# Patient Record
(demographics unavailable — no encounter records)

---

## 2017-12-13 PROCEDURE — 86900 BLOOD TYPING SEROLOGIC ABO: CPT | Performed by: OBSTETRICS & GYNECOLOGY

## 2017-12-13 PROCEDURE — 86780 TREPONEMA PALLIDUM: CPT | Performed by: OBSTETRICS & GYNECOLOGY

## 2017-12-13 PROCEDURE — 81003 URINALYSIS AUTO W/O SCOPE: CPT | Performed by: OBSTETRICS & GYNECOLOGY

## 2017-12-13 PROCEDURE — 36415 COLL VENOUS BLD VENIPUNCTURE: CPT | Performed by: OBSTETRICS & GYNECOLOGY

## 2017-12-13 PROCEDURE — 86901 BLOOD TYPING SEROLOGIC RH(D): CPT | Performed by: OBSTETRICS & GYNECOLOGY

## 2017-12-13 PROCEDURE — 86762 RUBELLA ANTIBODY: CPT | Performed by: OBSTETRICS & GYNECOLOGY

## 2017-12-13 PROCEDURE — 82105 ALPHA-FETOPROTEIN SERUM: CPT | Performed by: OBSTETRICS & GYNECOLOGY

## 2017-12-13 PROCEDURE — 86850 RBC ANTIBODY SCREEN: CPT | Performed by: OBSTETRICS & GYNECOLOGY

## 2017-12-13 PROCEDURE — 85025 COMPLETE CBC W/AUTO DIFF WBC: CPT | Performed by: OBSTETRICS & GYNECOLOGY

## 2017-12-13 PROCEDURE — 87389 HIV-1 AG W/HIV-1&-2 AB AG IA: CPT | Performed by: OBSTETRICS & GYNECOLOGY

## 2017-12-13 PROCEDURE — 87340 HEPATITIS B SURFACE AG IA: CPT | Performed by: OBSTETRICS & GYNECOLOGY

## 2018-03-06 PROCEDURE — 82950 GLUCOSE TEST: CPT | Performed by: OBSTETRICS & GYNECOLOGY

## 2018-03-06 PROCEDURE — 87389 HIV-1 AG W/HIV-1&-2 AB AG IA: CPT | Performed by: OBSTETRICS & GYNECOLOGY

## 2018-03-06 PROCEDURE — 86780 TREPONEMA PALLIDUM: CPT | Performed by: OBSTETRICS & GYNECOLOGY

## 2018-03-13 PROCEDURE — 82952 GTT-ADDED SAMPLES: CPT | Performed by: OBSTETRICS & GYNECOLOGY

## 2018-03-13 PROCEDURE — 82951 GLUCOSE TOLERANCE TEST (GTT): CPT | Performed by: OBSTETRICS & GYNECOLOGY

## 2018-03-13 PROCEDURE — 36415 COLL VENOUS BLD VENIPUNCTURE: CPT | Performed by: OBSTETRICS & GYNECOLOGY

## 2018-04-04 PROBLEM — Z34.03 ENCOUNTER FOR SUPERVISION OF NORMAL FIRST PREGNANCY IN THIRD TRIMESTER: Status: ACTIVE | Noted: 2018-04-04

## 2018-05-02 PROCEDURE — 87653 STREP B DNA AMP PROBE: CPT | Performed by: OBSTETRICS & GYNECOLOGY

## 2018-05-02 PROCEDURE — 87081 CULTURE SCREEN ONLY: CPT | Performed by: OBSTETRICS & GYNECOLOGY

## 2024-03-12 NOTE — ED INITIAL ASSESSMENT (HPI)
Pt reports nasal congestion, headache, nausea, and shortness of breath which started a few days ago.

## 2024-03-12 NOTE — ED PROVIDER NOTES
Patient Seen in: [facility]      History     Chief Complaint   Patient presents with    Cough     Stated Complaint: short of breath/nausea    Subjective:   HPI    This is a 30-year-old female with history of anemia and asthma presenting with nasal congestion headache nausea cough and shortness of breath.  Patient states a few days ago her symptoms started.  Patient states she thinks that some kind of a cold but wanted to be evaluated.  No known COVID or influenza exposure.  No chest pain vomiting or diarrhea.    Objective:   Past Medical History:   Diagnosis Date    Anemia     Gallstone               History reviewed. No pertinent surgical history.             Social History     Socioeconomic History    Marital status:    Tobacco Use    Smoking status: Never    Smokeless tobacco: Never   Substance and Sexual Activity    Alcohol use: No    Drug use: No              Review of Systems    Positive for stated complaint: short of breath/nausea  Other systems are as noted in HPI.  Constitutional and vital signs reviewed.      All other systems reviewed and negative except as noted above.    Physical Exam     ED Triage Vitals [03/12/24 1241]   /75   Pulse 100   Resp 18   Temp 97.8 °F (36.6 °C)   Temp src Temporal   SpO2 98 %   O2 Device None (Room air)       Current:/75   Pulse 100   Temp 97.8 °F (36.6 °C) (Temporal)   Resp 18   LMP 02/21/2024   SpO2 98%         Physical Exam  Vitals and nursing note reviewed.   Constitutional:       Appearance: Normal appearance.   HENT:      Right Ear: Tympanic membrane normal.      Left Ear: Tympanic membrane normal.      Nose: Congestion and rhinorrhea present.      Mouth/Throat:      Mouth: Mucous membranes are moist.      Pharynx: Oropharynx is clear. No posterior oropharyngeal erythema.      Comments: No hot potato voice or trismus uvula midline  Eyes:      Conjunctiva/sclera: Conjunctivae normal.   Cardiovascular:      Rate and Rhythm: Normal  rate.   Pulmonary:      Effort: Pulmonary effort is normal. No respiratory distress.      Comments: Lungs clear with faint expiratory wheeze no hypoxia or respiratory distress  + cough  Musculoskeletal:         General: Normal range of motion.      Cervical back: Normal range of motion. No rigidity or tenderness.   Lymphadenopathy:      Cervical: No cervical adenopathy.   Neurological:      Mental Status: She is alert and oriented to person, place, and time.               ED Course     Labs Reviewed   RAPID SARS-COV-2 BY PCR - Abnormal; Notable for the following components:       Result Value    Rapid SARS-CoV-2 by PCR Detected (*)     All other components within normal limits            MDM          Medical Decision Making  30-year-old female well-appearing nontoxic afebrile no hypoxia or distress speaking in full sentences with viral symptoms.  No clinical indication for labs or imaging concern for COVID influenza versus URI versus another respiratory or viral illness or acute bronchospasm.  Patient will be swabbed for COVID if COVID is negative then influenza swab will be collected.    COVID-positive patient made aware results discussed symptoms are likely related to COVID.  Discussed prescribing albuterol inhaler and nebulizer solution to use at home for any wheezing and the cough.  Discussed over-the-counter allergy meds Flonase and Zyrtec to help with runny nose congestion or postnasal drip.  Discussed ibuprofen and Tylenol for pain.  Discussed social isolation per CDC protocol and strict ER precautions with any new or worsening symptoms.  Patient feels comfortable with the plan of care and acknowledges understanding discharge instructions.    Problems Addressed:  COVID-19: acute illness or injury    Amount and/or Complexity of Data Reviewed  Labs:  Decision-making details documented in ED Course.    Risk  OTC drugs.  Prescription drug management.        Disposition and Plan     Clinical Impression:  1.  COVID-19         Disposition:  Discharge  3/12/2024  1:00 pm    Follow-up:    Follow-up with your primary care provider if symptoms persist or worsen              Medications Prescribed:  Current Discharge Medication List        START taking these medications    Details   albuterol 108 (90 Base) MCG/ACT Inhalation Aero Soln Inhale 2 puffs into the lungs every 4 (four) hours as needed for Wheezing or Shortness of Breath.  Qty: 1 each, Refills: 0      albuterol (2.5 MG/3ML) 0.083% Inhalation Nebu Soln Take 3 mL (2.5 mg total) by nebulization every 4 (four) hours as needed for Wheezing or Shortness of Breath.  Qty: 30 each, Refills: 0

## 2024-03-12 NOTE — DISCHARGE INSTRUCTIONS
Social isolation as this is the recommendation of the CDC.  If you develop worsening symptoms such as chest pain shortness of breath nausea vomiting or diarrhea severe headache or high fever go to the emergency department.  For mild symptoms such as body aches chills slight headache low-grade temp take Tylenol/ibuprofen.  Hydrate well but do not over hydrate and eat food as tolerated.  You may try over-the-counter Flonase and Zyrtec to help with nasal congestion and runny nose and any postnasal drip

## 2024-06-02 NOTE — ED INITIAL ASSESSMENT (HPI)
Pt reports sore throat beginning Wednesday with subjective temperatures.   C/o eye irritation and ear pain.

## 2024-06-02 NOTE — ED PROVIDER NOTES
Patient Seen in: [facility]      History     Chief Complaint   Patient presents with    Sore Throat     Stated Complaint: ear problem; eye problem; sore throat  Subjective:   29 y/o healthy female presents for a sore throat, nasal congestion, right ear pain, and bilateral eye redness and drainage that started about 4 days ago.  She denies any change in vision.  She wears glasses, but not contact lenses.  No eye pain.  No fevers.  She is eating and drinking without vomiting or diarrhea.  No neck stiffness.  No rashes.  No headaches.  No dizziness.  No drainage from the right ear.  She does have some muffled hearing.  No mastoid complaints.  No sick contacts at home.  She states she had COVID within the past 90 days.  She appears nontoxic.      Objective:   Past Medical History:    Anemia    Gallstone            History reviewed. No pertinent surgical history.           Social History     Socioeconomic History    Marital status:    Tobacco Use    Smoking status: Never    Smokeless tobacco: Never   Substance and Sexual Activity    Alcohol use: No    Drug use: No     Social Determinants of Health     Financial Resource Strain: Low Risk  (3/19/2024)    Received from [facility]    Overall Financial Resource Strain (CARDIA)     Difficulty of Paying Living Expenses: Not hard at all   Food Insecurity: No Food Insecurity (3/19/2024)    Received from [facility]    Food Insecurity     Within the past 30 days, I worried whether my food would run out before I got money to buy more. / En los últimos 30 días, me preocupó que la comida se podía acabar antes de tener dinero para compr...: Never true / N[place]ca     Within the past 30 days, the food that I bought just didn't last, and I didn't have money to get more. / En los últimos 30 días, La comida que compré no rindió lo suficiente, y no tenía dinero para...: Never true / Nunca   Transportation Needs: Low Risk   (8/24/2022)    [facility]    Transportation Needs     Do you have trouble getting transportation to medical appointments?: No   Housing Stability: Low Risk  (3/19/2024)    Received from [facility]    Housing Stability Vital Sign     Unable to Pay for Housing in the Last Year: No     Number of Places Lived in the Last Year: 1     Unstable Housing in the Last Year: No            Review of Systems    Positive for stated complaint: ear problem; eye problem; sore throat  Other systems are as noted in HPI.  Constitutional and vital signs reviewed.      All other systems reviewed and negative except as noted above.    Physical Exam     ED Triage Vitals [06/02/24 0808]   /73   Pulse 87   Resp 18   Temp 97.7 °F (36.5 °C)   Temp src Temporal   SpO2 99 %   O2 Device None (Room air)     Current:/73   Pulse 87   Temp 97.7 °F (36.5 °C) (Temporal)   Resp 18   LMP 04/22/2024   SpO2 99%     Physical Exam  Vitals and nursing note reviewed.   Constitutional:       General: She is not in acute distress.     Appearance: She is well-developed. She is not toxic-appearing.   HENT:      Head: Normocephalic.      Right Ear: Ear canal normal. A middle ear effusion is present. No mastoid tenderness. Tympanic membrane is erythematous.      Left Ear: Ear canal normal.  No middle ear effusion. No mastoid tenderness. Tympanic membrane is not erythematous.      Nose: Congestion present. No rhinorrhea.      Mouth/Throat:      Lips: Pink.      Mouth: Mucous membranes are moist.      Pharynx: Oropharynx is clear. Uvula midline. Posterior oropharyngeal erythema present. No pharyngeal swelling, oropharyngeal exudate or uvula swelling.      Tonsils: No tonsillar exudate or tonsillar abscesses.   Eyes:      General: Lids are normal.      Extraocular Movements: Extraocular movements intact.      Conjunctiva/sclera:      Right eye: Right conjunctiva  is injected. Exudate present.      Left eye: Left conjunctiva is injected. Exudate present.      Pupils: Pupils are equal, round, and reactive to light.   Cardiovascular:      Rate and Rhythm: Normal rate and regular rhythm.   Pulmonary:      Effort: Pulmonary effort is normal.      Breath sounds: Normal breath sounds. No wheezing, rhonchi or rales.   Chest:      Chest wall: No tenderness.   Musculoskeletal:         General: Normal range of motion.      Cervical back: Normal range of motion and neck supple.   Lymphadenopathy:      Cervical: No cervical adenopathy.   Skin:     General: Skin is warm and dry.      Capillary Refill: Capillary refill takes less than 2 seconds.      Findings: No rash.   Neurological:      General: No focal deficit present.      Mental Status: She is alert and oriented to person, place, and time.   Psychiatric:         Mood and Affect: Mood normal.         Behavior: Behavior normal.         ED Course   No results found.  Labs Reviewed   POCT RAPID STREP - Normal       MDM     Medical Decision Making  The rapid strep test is negative.  The patient had COVID within the past 90 days, so a COVID test was not done.  I will treat the patient for a right otitis media with Augmentin.  I will also prescribe her tobramycin drops for her conjunctivitis.  We discussed wiping the drainage from the eyes with warm washcloth and frequent handwashing.  We also discussed pushing fluids, rest, and Tylenol or Motrin as needed for pain or fever.  She will follow-up as needed with her primary care doctor if her symptoms persist or worsen.    Amount and/or Complexity of Data Reviewed  Labs: ordered.     Details: The rapid strep test is negative.    Risk  OTC drugs.  Prescription drug management.  Risk Details: Strep throat versus upper respiratory virus versus otitis media        Disposition and Plan     Clinical Impression:  1. Acute conjunctivitis of both eyes, unspecified acute conjunctivitis type    2. Right  otitis media, unspecified otitis media type         Disposition:  Discharge  6/2/2024  8:39 am    Follow-up:  PMD    Schedule an appointment as soon as possible for a visit   As needed          Medications Prescribed:  Current Discharge Medication List        START taking these medications    Details   tobramycin 0.3 % Ophthalmic Solution Place 2 drops into both eyes every 6 (six) hours for 7 days.  Qty: 1 each, Refills: 0      amoxicillin clavulanate 875-125 MG Oral Tab Take 1 tablet by mouth 2 (two) times daily for 10 days.  Qty: 20 tablet, Refills: 0

## 2024-06-02 NOTE — DISCHARGE INSTRUCTIONS
The drainage from the eyes with soap.  Frequent handwashing.  Tylenol or Motrin as needed for pain or fever.  Use the drops as prescribed.  Take the Augmentin as prescribed.  Push fluids and rest.  Follow-up as needed with your doctor if your symptoms persist or worsen.  Return for any concerns.

## 2025-04-08 NOTE — PROGRESS NOTES
Subjective:   Swapna Kurtz is a 31 year old female who presents for Pain (Throat and both ears pain, congested, past 3 weeks , took ibuprofen, dayquil )   Patient is a pleasant 31-year-old female past medical history significant for obesity and hidradenitis suppurativa.  Patient presents office today new to this office and new to this provider to establish care and be evaluated for throat pain and ear pain.      PMH: Allergies, asthma   PSH: Cholecystectomy 2021  Meds: allegra and albuterol     Patient states she started with a sore throat about 3 weeks ago. Worse at night and in am. She has pain in her right ear as well. NO fever, no chills. NO chest congestion. Does have occasional wheezing. Does have pain in back of throat. Worse when laying down. She has tried hot teas with lemon at home. Some dry cough as well. She is taking allegra once per day. No sick contacts now, but when it first started her daughter was sick.   ACT score 15    Will need to follow up for physical.         Past Medical History:    Anemia    Gallstone      History reviewed. No pertinent surgical history.     History/Other:    Chief Complaint Reviewed and Verified  Nursing Notes Reviewed and   Verified  Tobacco Reviewed  Allergies Reviewed  Medications Reviewed    Problem List Reviewed  Medical History Reviewed  Surgical History   Reviewed  OB Status Reviewed  Family History Reviewed  Social History   Reviewed         Tobacco:  She has never smoked tobacco.    Current Outpatient Medications   Medication Sig Dispense Refill    albuterol 108 (90 Base) MCG/ACT Inhalation Aero Soln Inhale 2 puffs into the lungs every 4 (four) hours as needed for Wheezing or Shortness of Breath. 18 g 5    predniSONE 20 MG Oral Tab Take 2 tablets once a day for 5 days 10 tablet 0    azithromycin (ZITHROMAX Z-MERCEDEZ) 250 MG Oral Tab Take 2 tablets (500 mg total) by mouth daily for 1 day, THEN 1 tablet (250 mg total) daily for 4 days. 6 tablet 0     fluticasone propionate 50 MCG/ACT Nasal Suspension 2 sprays by Each Nare route daily. 9.9 mL 3    albuterol (2.5 MG/3ML) 0.083% Inhalation Nebu Soln Take 3 mL (2.5 mg total) by nebulization every 4 (four) hours as needed for Wheezing or Shortness of Breath. 30 each 0    Respiratory Therapy Supplies (NEBULIZER/TUBING/MOUTHPIECE) Does not apply Kit To be used with nebulizer machine 1 each 0    Spacer/Aero-Holding Chambers Does not apply Device To be given as supplement to previously rx'ed albuterol inhaler 1 each 0         Review of Systems:  Review of Systems   Constitutional:  Negative for chills and fever.   HENT:  Positive for congestion, ear pain, postnasal drip, rhinorrhea and sore throat. Negative for facial swelling, sinus pressure and sinus pain.    Respiratory:  Positive for cough, shortness of breath and wheezing.    Cardiovascular:  Negative for chest pain.         Objective:   /71 (BP Location: Left arm, Patient Position: Sitting, Cuff Size: large)   Pulse 76   Temp 98.4 °F (36.9 °C) (Tympanic)   Ht 5' 4\" (1.626 m)   Wt 258 lb (117 kg)   LMP 03/29/2025 (Approximate)   SpO2 97%   Breastfeeding No   BMI 44.29 kg/m²  Estimated body mass index is 44.29 kg/m² as calculated from the following:    Height as of this encounter: 5' 4\" (1.626 m).    Weight as of this encounter: 258 lb (117 kg).  Physical Exam  Vitals and nursing note reviewed.   Constitutional:       Appearance: Normal appearance. She is obese.   HENT:      Head: Normocephalic and atraumatic.      Right Ear: Tympanic membrane, ear canal and external ear normal.      Left Ear: Tympanic membrane, ear canal and external ear normal.      Nose: Congestion and rhinorrhea present.      Comments: Pale and boggy      Mouth/Throat:      Mouth: Mucous membranes are moist.      Pharynx: Oropharynx is clear. No oropharyngeal exudate or posterior oropharyngeal erythema.      Comments: +pnd  Cardiovascular:      Rate and Rhythm: Normal rate and  regular rhythm.      Pulses: Normal pulses.      Heart sounds: Normal heart sounds. No murmur heard.  Pulmonary:      Effort: Pulmonary effort is normal.      Breath sounds: Wheezing and rhonchi present.   Skin:     General: Skin is warm.      Capillary Refill: Capillary refill takes less than 2 seconds.   Neurological:      Mental Status: She is alert and oriented to person, place, and time.           Assessment & Plan:   1. History of cholecystectomy (Primary)  2. Exacerbation of asthma, unspecified asthma severity, unspecified whether persistent (HCC)  -     XR CHEST PA + LAT CHEST (CPT=71046); Future; Expected date: 04/10/2025  3. Allergic rhinitis, unspecified seasonality, unspecified trigger  -     predniSONE; Take 2 tablets once a day for 5 days  Dispense: 10 tablet; Refill: 0  -     Fluticasone Propionate; 2 sprays by Each Nare route daily.  Dispense: 9.9 mL; Refill: 3  4. Lower respiratory tract infection  -     Azithromycin; Take 2 tablets (500 mg total) by mouth daily for 1 day, THEN 1 tablet (250 mg total) daily for 4 days.  Dispense: 6 tablet; Refill: 0  -     XR CHEST PA + LAT CHEST (CPT=71046); Future; Expected date: 04/10/2025  5. Bronchitis with wheezing  -     Albuterol Sulfate HFA; Inhale 2 puffs into the lungs every 4 (four) hours as needed for Wheezing or Shortness of Breath.  Dispense: 18 g; Refill: 5  -     predniSONE; Take 2 tablets once a day for 5 days  Dispense: 10 tablet; Refill: 0  -     Azithromycin; Take 2 tablets (500 mg total) by mouth daily for 1 day, THEN 1 tablet (250 mg total) daily for 4 days.  Dispense: 6 tablet; Refill: 0  -     Fluticasone Propionate; 2 sprays by Each Nare route daily.  Dispense: 9.9 mL; Refill: 3    1. Exacerbation of asthma, unspecified asthma severity, unspecified whether persistent (HCC)  ACT score 15  Wheezing and rhonchi on exam  Cough and congestion x 3 weeks  Check CXR  Albuterol RX q4 prn  Pred burst  Zpack  Follow up 1 week   - XR CHEST PA + LAT  CHEST (CPT=71046); Future    2. Allergic rhinitis, unspecified seasonality, unspecified trigger  Patient with exam consistent for allergic rhinitis  Pale boggy turbinates, postnasal drip with cobblestoning, and congestion in back of throat.  Patient educated on allergic rhinitis.  Educated on the use of normal saline rinses to flush out allergens.  Start antihistamine once daily.  Start Flonase once daily bilateral nares.  If symptoms persist will consider steroid burst, addition of Singulair, or referral to allergy specialist.  Red flags reviewed  Follow-up as needed    - predniSONE 20 MG Oral Tab; Take 2 tablets once a day for 5 days  Dispense: 10 tablet; Refill: 0  - fluticasone propionate 50 MCG/ACT Nasal Suspension; 2 sprays by Each Nare route daily.  Dispense: 9.9 mL; Refill: 3    3. History of cholecystectomy  Removed 2021  stable    4. Lower respiratory tract infection  CXR today  Start zpack  Add augmentin as needed  - azithromycin (ZITHROMAX Z-MERCEDEZ) 250 MG Oral Tab; Take 2 tablets (500 mg total) by mouth daily for 1 day, THEN 1 tablet (250 mg total) daily for 4 days.  Dispense: 6 tablet; Refill: 0  - XR CHEST PA + LAT CHEST (CPT=71046); Future    5. Bronchitis with wheezing  2/2 asthma and URI  Check cxr  Start pred burst  Start zpack  Follow up 1 week physical   - albuterol 108 (90 Base) MCG/ACT Inhalation Aero Soln; Inhale 2 puffs into the lungs every 4 (four) hours as needed for Wheezing or Shortness of Breath.  Dispense: 18 g; Refill: 5  - predniSONE 20 MG Oral Tab; Take 2 tablets once a day for 5 days  Dispense: 10 tablet; Refill: 0  - azithromycin (ZITHROMAX Z-MERCEDEZ) 250 MG Oral Tab; Take 2 tablets (500 mg total) by mouth daily for 1 day, THEN 1 tablet (250 mg total) daily for 4 days.  Dispense: 6 tablet; Refill: 0  - fluticasone propionate 50 MCG/ACT Nasal Suspension; 2 sprays by Each Nare route daily.  Dispense: 9.9 mL; Refill: 3    Patient aware of plan of care. All questions answered to satisfaction  of the patient. Patient instructed to call office or reach out via [employer] if any issues arise. For urgent issues and/or reviewed red flags please proceed to the urgent care or ER.  Also, inform the nurse practitioner with any new symptoms or medication side effects.          Return in about 1 week (around 4/17/2025) for Annual physical.    FELECIA Hamilton, 4/8/2025, 8:27 AM

## 2025-04-17 NOTE — PROGRESS NOTES
Subjective:   Swapna Kurtz is a 31 year old female who presents for Physical (Lab order,)   Patient is a pleasant 31-year-old female past medical history significant for obesity, asthma, allergic rhinitis, and hidradenitis suppurativa.  Patient presents office today for follow up recent visit for bronchitis on 4/10/25 and to obtain physical. Patient CXR was negative, she was started on zpack and pred burst and AR regimen. ACT was 15, now 20.     Patient states her health is could be better. Wants to work on physical health. She works with dogs. Discussed switching to xyzal from allegra    Diet: Wants to incorporate more veggies. Worried about over eating. Mostly home cooked. Concerned about weight. Her ideal weight would be 180 lbs.   Exercise: wants to start zoomba. Educated on recommendations.   Sleep: 6-7 hours per night. No trouble falling asleep or staying asleep.   Stress: minimal. Likes to read or take long showers.   Social: , 2 kids, lives in [facility].   Sexually Active: not currently.   Prophylaxis: N/A  Alcohol: none  Tobacco: none, passive in past.   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   Vaccinations: Prevnar 20 advised.   PAP: 2022. Needs ob referral.   Menses: every 28 days, 4 days, 3 pads. No pain.          Past Medical History[1]   Past Surgical History[2]     History/Other:    Chief Complaint Reviewed and Verified  Nursing Notes Reviewed and   Verified  Tobacco Reviewed  Allergies Reviewed  Medications Reviewed    Problem List Reviewed  Medical History Reviewed  Surgical History   Reviewed  OB Status Reviewed  Family History Reviewed  Social History   Reviewed         Tobacco:  She has never smoked tobacco.    Current Medications[3]      Review of Systems:  Review of Systems   Constitutional: Negative.  Negative for activity change and appetite change.   HENT: Negative.  Negative for congestion, postnasal drip,  rhinorrhea, sore throat, tinnitus and voice change.    Eyes: Negative.    Respiratory: Negative.  Negative for apnea, cough, chest tightness and shortness of breath.    Cardiovascular:  Negative for chest pain and leg swelling.   Gastrointestinal: Negative.  Negative for abdominal pain, anal bleeding, blood in stool, constipation, diarrhea, nausea and vomiting.   Genitourinary: Negative.  Negative for difficulty urinating, flank pain and menstrual problem.   Musculoskeletal: Negative.  Negative for joint swelling.   Skin: Negative.    Neurological: Negative.  Negative for dizziness and headaches.   Psychiatric/Behavioral: Negative.  Negative for agitation.          Objective:   /73 (BP Location: Right arm, Patient Position: Sitting, Cuff Size: large)   Pulse 79   Ht 5' 4\" (1.626 m)   Wt 257 lb 9.6 oz (116.8 kg)   LMP 03/29/2025 (Approximate)   SpO2 97%   BMI 44.22 kg/m²  Estimated body mass index is 44.22 kg/m² as calculated from the following:    Height as of this encounter: 5' 4\" (1.626 m).    Weight as of this encounter: 257 lb 9.6 oz (116.8 kg).  Physical Exam  Vitals and nursing note reviewed.   Constitutional:       General: She is not in acute distress.     Appearance: She is well-developed. She is obese.   HENT:      Head: Normocephalic and atraumatic.      Right Ear: Tympanic membrane, ear canal and external ear normal. There is no impacted cerumen.      Left Ear: Tympanic membrane, ear canal and external ear normal. There is no impacted cerumen.      Nose: Nose normal. No congestion or rhinorrhea.      Mouth/Throat:      Mouth: Mucous membranes are moist.      Pharynx: Oropharynx is clear. No oropharyngeal exudate or posterior oropharyngeal erythema.   Eyes:      Conjunctiva/sclera: Conjunctivae normal.      Pupils: Pupils are equal, round, and reactive to light.   Neck:      Thyroid: No thyromegaly.   Cardiovascular:      Rate and Rhythm: Normal rate and regular rhythm.      Pulses: Normal  pulses.      Heart sounds: Normal heart sounds. No murmur heard.  Pulmonary:      Effort: Pulmonary effort is normal. No respiratory distress.      Breath sounds: Normal breath sounds. No wheezing or rales.   Chest:      Chest wall: No tenderness.   Abdominal:      General: Bowel sounds are normal. There is no distension.      Palpations: Abdomen is soft.      Tenderness: There is no abdominal tenderness.   Musculoskeletal:         General: No tenderness. Normal range of motion.      Cervical back: Normal range of motion and neck supple.   Lymphadenopathy:      Cervical: No cervical adenopathy.   Skin:     General: Skin is warm and dry.      Capillary Refill: Capillary refill takes less than 2 seconds.      Findings: No rash.      Comments: Acanthosis nigrican posterior neck    Neurological:      Mental Status: She is alert and oriented to person, place, and time.      Coordination: Coordination normal.   Psychiatric:         Behavior: Behavior normal.         Thought Content: Thought content normal.         Judgment: Judgment normal.           Assessment & Plan:   1. Encounter for wellness examination in adult (Primary)  -     Hemoglobin A1C; Future; Expected date: 04/18/2025  -     CBC With Differential With Platelet; Future; Expected date: 04/18/2025  -     Comp Metabolic Panel (14); Future; Expected date: 04/18/2025  -     Lipid Panel; Future; Expected date: 04/18/2025  -     TSH W Reflex To Free T4; Future; Expected date: 04/18/2025  2. Bronchitis with wheezing  -     Levocetirizine Dihydrochloride; Take 1 tablet (5 mg total) by mouth every evening.  Dispense: 90 tablet; Refill: 0  3. Obesity, Class III, BMI 40-49.9 (morbid obesity) (HCC)  -     Hemoglobin A1C; Future; Expected date: 04/18/2025  -     Lipid Panel; Future; Expected date: 04/18/2025  4. Cervical cancer screening  -     OBG Referral - Evan ([place])  5. Acanthosis nigricans    1. Encounter for wellness examination in adult  Wellness labs  ordered.  Encouraged increasing physical activity which includes raising heart rate 3 to 5 days/week for at least 30 minutes at a time, while also performing mild strength exercises.  Patient counseled on importance of dietary modifications, which may include limiting fats, red meat, and carbohydrates, while increasing fruit and vegetable intake, and trying to adhere to a low sodium/Mediterranean diet.  Encouraged to manage stress.  Encouraged good sleep habits.  Encouraged good sexual health.    - Hemoglobin A1C; Future  - CBC With Differential With Platelet; Future  - Comp Metabolic Panel (14); Future  - Lipid Panel; Future  - TSH W Reflex To Free T4; Future    2. Bronchitis with wheezing  Resolved  Change allegra to levocetrizine  ACT 20  - levocetirizine 5 MG Oral Tab; Take 1 tablet (5 mg total) by mouth every evening.  Dispense: 90 tablet; Refill: 0    3. Obesity, Class III, BMI 40-49.9 (morbid obesity) (HCC)  -BMI in office today 44.42  -Recommend low fat/low cholesterol diet and mediterranean diet  -Advised to decrease the amount of carbohydrates in diet (bread products, rice, pasta, potatoes, cereals, starchy vegetables)  -Avoid fried, fatty, greasy foods, cheese, red-meats, egg yolks and processed foods  -Recommend to cut out foods and beverages that contain high sugars.  -Increase your vegetable intake.  -Consider over the counter metamucil once daily with 8 oz water to lower cholesterol   -Try to exercise at least 30 minutes per day 3-5 times per week with mixture of cardio and weight training.   Check labs, see if candidate for weight loss  - Hemoglobin A1C; Future  - Lipid Panel; Future    4. Cervical cancer screening  Referral ob for pap  - OBG Referral - [facility] ([facility])    5. Acanthosis nigricans  Check A1c  Consider weight loss assistance    Patient aware of plan of care. All questions answered to satisfaction of the patient. Patient instructed to call office or reach out via [employer] if any issues  arise. For urgent issues and/or reviewed red flags please proceed to the urgent care or ER.  Also, inform the nurse practitioner with any new symptoms or medication side effects.          Return if symptoms worsen or fail to improve.    Juan FELECIA Goodwin, 4/17/2025, 9:43 AM          [1]   Past Medical History:   Anemia    Gallstone   [2] History reviewed. No pertinent surgical history.  [3]   Current Outpatient Medications   Medication Sig Dispense Refill    levocetirizine 5 MG Oral Tab Take 1 tablet (5 mg total) by mouth every evening. 90 tablet 0    albuterol 108 (90 Base) MCG/ACT Inhalation Aero Soln Inhale 2 puffs into the lungs every 4 (four) hours as needed for Wheezing or Shortness of Breath. 18 g 5    fluticasone propionate 50 MCG/ACT Nasal Suspension 2 sprays by Each Nare route daily. 9.9 mL 3    albuterol (2.5 MG/3ML) 0.083% Inhalation Nebu Soln Take 3 mL (2.5 mg total) by nebulization every 4 (four) hours as needed for Wheezing or Shortness of Breath. 30 each 0    predniSONE 20 MG Oral Tab Take 2 tablets once a day for 5 days (Patient not taking: Reported on 4/18/2025) 10 tablet 0    Respiratory Therapy Supplies (NEBULIZER/TUBING/MOUTHPIECE) Does not apply Kit To be used with nebulizer machine (Patient not taking: Reported on 4/18/2025) 1 each 0    Spacer/Aero-Holding Chambers Does not apply Device To be given as supplement to previously rx'ed albuterol inhaler (Patient not taking: Reported on 4/18/2025) 1 each 0